# Patient Record
Sex: FEMALE | Race: WHITE | NOT HISPANIC OR LATINO
[De-identification: names, ages, dates, MRNs, and addresses within clinical notes are randomized per-mention and may not be internally consistent; named-entity substitution may affect disease eponyms.]

---

## 2023-09-20 PROBLEM — Z00.00 ENCOUNTER FOR PREVENTIVE HEALTH EXAMINATION: Status: ACTIVE | Noted: 2023-09-20

## 2023-10-03 ENCOUNTER — APPOINTMENT (OUTPATIENT)
Dept: ORTHOPEDIC SURGERY | Facility: CLINIC | Age: 63
End: 2023-10-03

## 2023-10-04 ENCOUNTER — APPOINTMENT (OUTPATIENT)
Dept: ORTHOPEDIC SURGERY | Facility: CLINIC | Age: 63
End: 2023-10-04
Payer: COMMERCIAL

## 2023-10-04 VITALS — BODY MASS INDEX: 21.71 KG/M2 | WEIGHT: 115 LBS | HEIGHT: 61 IN

## 2023-10-04 DIAGNOSIS — E11.9 TYPE 2 DIABETES MELLITUS W/OUT COMPLICATIONS: ICD-10-CM

## 2023-10-04 DIAGNOSIS — I10 ESSENTIAL (PRIMARY) HYPERTENSION: ICD-10-CM

## 2023-10-04 DIAGNOSIS — E78.00 PURE HYPERCHOLESTEROLEMIA, UNSPECIFIED: ICD-10-CM

## 2023-10-04 PROCEDURE — 20610 DRAIN/INJ JOINT/BURSA W/O US: CPT | Mod: LT

## 2023-10-04 PROCEDURE — 99213 OFFICE O/P EST LOW 20 MIN: CPT | Mod: 25

## 2023-10-04 RX ORDER — ROSUVASTATIN CALCIUM 10 MG/1
10 TABLET, FILM COATED ORAL
Refills: 0 | Status: ACTIVE | COMMUNITY

## 2023-10-04 RX ORDER — METFORMIN HYDROCHLORIDE 625 MG/1
TABLET ORAL
Refills: 0 | Status: ACTIVE | COMMUNITY

## 2023-10-04 RX ORDER — LOSARTAN POTASSIUM 25 MG/1
25 TABLET, FILM COATED ORAL
Refills: 0 | Status: ACTIVE | COMMUNITY

## 2023-10-13 ENCOUNTER — APPOINTMENT (OUTPATIENT)
Dept: ORTHOPEDIC SURGERY | Facility: CLINIC | Age: 63
End: 2023-10-13
Payer: COMMERCIAL

## 2023-10-13 VITALS — HEIGHT: 61 IN | BODY MASS INDEX: 21.71 KG/M2 | WEIGHT: 115 LBS

## 2023-10-13 PROCEDURE — 99213 OFFICE O/P EST LOW 20 MIN: CPT

## 2023-10-27 ENCOUNTER — TRANSCRIPTION ENCOUNTER (OUTPATIENT)
Age: 63
End: 2023-10-27

## 2023-11-03 ENCOUNTER — APPOINTMENT (OUTPATIENT)
Dept: ORTHOPEDIC SURGERY | Facility: CLINIC | Age: 63
End: 2023-11-03
Payer: COMMERCIAL

## 2023-11-03 VITALS — HEIGHT: 61 IN | BODY MASS INDEX: 21.71 KG/M2 | WEIGHT: 115 LBS

## 2023-11-03 PROCEDURE — 99213 OFFICE O/P EST LOW 20 MIN: CPT

## 2023-11-24 ENCOUNTER — APPOINTMENT (OUTPATIENT)
Dept: ORTHOPEDIC SURGERY | Facility: CLINIC | Age: 63
End: 2023-11-24
Payer: COMMERCIAL

## 2023-11-24 VITALS — HEIGHT: 61 IN | BODY MASS INDEX: 21.71 KG/M2 | WEIGHT: 115 LBS

## 2023-11-24 PROCEDURE — 99213 OFFICE O/P EST LOW 20 MIN: CPT

## 2023-12-15 ENCOUNTER — APPOINTMENT (OUTPATIENT)
Dept: ORTHOPEDIC SURGERY | Facility: CLINIC | Age: 63
End: 2023-12-15
Payer: COMMERCIAL

## 2023-12-15 VITALS — HEIGHT: 61 IN | BODY MASS INDEX: 21.71 KG/M2 | WEIGHT: 115 LBS

## 2023-12-15 PROCEDURE — 99213 OFFICE O/P EST LOW 20 MIN: CPT

## 2023-12-15 NOTE — ASSESSMENT
[FreeTextEntry1] : Somewhat concerned about the decreased dorsiflexion of her left ankle in light of her previous back problem and disc issues I recommended she get an EMG study to evaluate the weakness in the left lower extremity and follow-up after that is done she may need epidural shots or some other treatments to help improve her situation as recently, her to fall again in the future.  She will return after the EMG study is performed.

## 2023-12-15 NOTE — HISTORY OF PRESENT ILLNESS
[Left Leg] : left leg [Gradual] : gradual [0] : 0 [Tingling] : tingling [Intermittent] : intermittent [Physical therapy] : physical therapy [Full time] : Work status: full time [] : Post Surgical Visit: no [FreeTextEntry7] : L Leg  [de-identified] : 11/24/2023 [de-identified] : Dr. Robles  [de-identified] : 12/13/2023 [de-identified] : [employer]

## 2023-12-15 NOTE — REASON FOR VISIT
[FreeTextEntry2] : Patient feels improvement since last visit. Some continued tingling L Toes. Patient returns to the office today in follow-up regarding her lower back and left lower extremity symptoms.  She describes mild fall in the city immunizations and somewhat concerned about that examination today reveals ambulation with a slight antalgic gait affecting the left lower extremity she seems to have decreased pushoff on that side examination of the back reveals minimal tenderness in the lumbar paraspinals straight leg raising is negative DTRs are 1+ bilaterally she is able to walk on her her toes but has difficulty walking on her heels and appears to have some weakness to dorsiflexion in the left ankle.  She does not does have any significant decrease sensation in either lower extremity

## 2023-12-28 ENCOUNTER — APPOINTMENT (OUTPATIENT)
Dept: NEUROLOGY | Facility: CLINIC | Age: 63
End: 2023-12-28
Payer: COMMERCIAL

## 2023-12-28 PROCEDURE — 95886 MUSC TEST DONE W/N TEST COMP: CPT

## 2023-12-28 PROCEDURE — 95912 NRV CNDJ TEST 11-12 STUDIES: CPT

## 2024-01-12 ENCOUNTER — APPOINTMENT (OUTPATIENT)
Dept: ORTHOPEDIC SURGERY | Facility: CLINIC | Age: 64
End: 2024-01-12
Payer: COMMERCIAL

## 2024-01-12 VITALS — HEIGHT: 61 IN | WEIGHT: 115 LBS | BODY MASS INDEX: 21.71 KG/M2

## 2024-01-12 PROCEDURE — 99213 OFFICE O/P EST LOW 20 MIN: CPT

## 2024-01-12 NOTE — REASON FOR VISIT
[FreeTextEntry2] : Patient feels improvement L Back. Decreased pain. She would like to review EMG results today. Patient describes gradual improvement in her overall symptomology and function.  Her nerve conduction EMG studies are reviewed with her today showing minimal problem.  Examination today reveals ambulates with a fairly normal gait straight leg raising is negative DTRs are 1+ bilaterally she can walk on her toes pretty well but has some difficulty walking on her heels on the left side.  She has some mild decrease sensation in the left lower leg region.

## 2024-01-12 NOTE — ASSESSMENT
[FreeTextEntry1] : Currently patient has minimal pain she will continue work with her physical therapist to increase her function and strength in both lower extremities, including her flexibility she will return in 3 to 4 weeks for follow-up exam.

## 2024-01-12 NOTE — HISTORY OF PRESENT ILLNESS
[Lower back] : lower back [0] : 0 [Tingling] : tingling [Full time] : Work status: full time [] : Post Surgical Visit: no [FreeTextEntry6] : L Toes [FreeTextEntry7] : L Toes  [de-identified] : 12/15/2023 [de-identified] : Dr. Robles  [de-identified] : EMG [de-identified] : [employer]

## 2024-02-09 ENCOUNTER — APPOINTMENT (OUTPATIENT)
Dept: ORTHOPEDIC SURGERY | Facility: CLINIC | Age: 64
End: 2024-02-09
Payer: COMMERCIAL

## 2024-02-09 VITALS — WEIGHT: 115 LBS | BODY MASS INDEX: 21.71 KG/M2 | HEIGHT: 61 IN

## 2024-02-09 DIAGNOSIS — M54.16 RADICULOPATHY, LUMBAR REGION: ICD-10-CM

## 2024-02-09 DIAGNOSIS — M70.62 TROCHANTERIC BURSITIS, LEFT HIP: ICD-10-CM

## 2024-02-09 PROCEDURE — 99213 OFFICE O/P EST LOW 20 MIN: CPT

## 2024-02-09 NOTE — REASON FOR VISIT
[FreeTextEntry2] : Patient feels improvement since last visit. No pain today L Back L Leg. She has finished with PT this week./ Patient returns to the office reporting no significant pain at this point in time she reports the back pain and radicular symptoms have resolved with her therapy and medication.  Today she is ambulating with a normal gait straight leg raising is negative DTRs are 1+ bilaterally she has minimal tenderness in the left lumbar paraspinals and mild tenderness at the SI joint minimal tenderness noted at the left trochanteric bursa good range of motion the left hip.

## 2024-02-09 NOTE — HISTORY OF PRESENT ILLNESS
[Lower back] : lower back [Left Leg] : left leg [0] : 0 [Rest] : rest [Physical therapy] : physical therapy [Part time] : Work status: part time [] : Post Surgical Visit: no [de-identified] : 1/12/2024 [de-identified] : Dr. Robles  [de-identified] : 1/31/2024 [de-identified] : MRI/EMG [de-identified] : [employer]

## 2024-02-09 NOTE — ASSESSMENT
[FreeTextEntry1] : At this point in time her symptoms are essentially resolved she has stopped her therapy this week and she will continue working on exercises on her own she will gradually return to her normal activities and will return for 1 more follow-up in approximately 4 weeks.

## 2024-03-29 ENCOUNTER — APPOINTMENT (OUTPATIENT)
Dept: ORTHOPEDIC SURGERY | Facility: CLINIC | Age: 64
End: 2024-03-29